# Patient Record
Sex: MALE | Race: WHITE | NOT HISPANIC OR LATINO
[De-identification: names, ages, dates, MRNs, and addresses within clinical notes are randomized per-mention and may not be internally consistent; named-entity substitution may affect disease eponyms.]

---

## 2019-04-17 ENCOUNTER — TRANSCRIPTION ENCOUNTER (OUTPATIENT)
Age: 29
End: 2019-04-17

## 2019-04-29 ENCOUNTER — TRANSCRIPTION ENCOUNTER (OUTPATIENT)
Age: 29
End: 2019-04-29

## 2019-08-28 ENCOUNTER — EMERGENCY (EMERGENCY)
Facility: HOSPITAL | Age: 29
LOS: 1 days | Discharge: HOME | End: 2019-08-28
Attending: EMERGENCY MEDICINE
Payer: SELF-PAY

## 2019-08-28 VITALS — HEIGHT: 72 IN | RESPIRATION RATE: 16 BRPM | OXYGEN SATURATION: 98 % | TEMPERATURE: 98 F | WEIGHT: 175.05 LBS

## 2019-08-28 PROCEDURE — 99053 MED SERV 10PM-8AM 24 HR FAC: CPT

## 2019-08-28 PROCEDURE — 99284 EMERGENCY DEPT VISIT MOD MDM: CPT

## 2019-08-28 PROCEDURE — 99285 EMERGENCY DEPT VISIT HI MDM: CPT

## 2019-08-28 NOTE — ED ADULT NURSE NOTE - CHIEF COMPLAINT QUOTE
Pt unsure why he was brought to hospital.  [facility] states he was brought to medical at [facility] and they were concerned for aspiration due to intoxication.  Patient confused in ED, admits to etoh.  Patient changed into yellow gown belongings secured in .

## 2019-08-28 NOTE — ED ADULT TRIAGE NOTE - CHIEF COMPLAINT QUOTE
Pt unsure why he was brought to hospital.  [facility] states he was brought to medical at [facility] and they were concerned for aspiration due to intoxication.  Patient confused in ED, admits to etoh.  Patient changed into yellow gown belongings secured in .

## 2019-08-28 NOTE — ED ADULT NURSE NOTE - OBJECTIVE STATEMENT
received adult male patient alert skin warm and dry to emergency room via [facility] ambulance intox simms respiration equal and unlabored will continue to monitor

## 2019-08-29 VITALS
HEART RATE: 96 BPM | DIASTOLIC BLOOD PRESSURE: 85 MMHG | TEMPERATURE: 98 F | SYSTOLIC BLOOD PRESSURE: 123 MMHG | RESPIRATION RATE: 18 BRPM | OXYGEN SATURATION: 99 %

## 2019-08-29 NOTE — ED PROVIDER NOTE - CLINICAL SUMMARY MEDICAL DECISION MAKING FREE TEXT BOX
Pt's friend arrived to ED to  pt. Pt currently has no complaints and is ambulatory with steady gait. Will discharge home.

## 2019-08-29 NOTE — ED PROVIDER NOTE - PATIENT PORTAL LINK FT
You can access the FollowMyHealth Patient Portal offered by [facility] by registering at the following website: [email or web address]. By joining [facility]’s FollowMyHealth portal, you will also be able to view your health information using other applications (apps) compatible with our system.

## 2019-08-29 NOTE — ED PROVIDER NOTE - OBJECTIVE STATEMENT
27 y/o M pt with no significant PMHx presents to the ED c/o alcohol intoxication, as well as vomiting. Pt was at a concert at [employer], but has limited memory of what happened. Denies fever, chills. No further complaints at this time.

## 2021-04-09 PROBLEM — Z78.9 OTHER SPECIFIED HEALTH STATUS: Chronic | Status: ACTIVE | Noted: 2019-08-28

## 2021-04-12 ENCOUNTER — APPOINTMENT (OUTPATIENT)
Age: 31
End: 2021-04-12
Payer: COMMERCIAL

## 2021-04-12 PROCEDURE — 0001A: CPT

## 2021-05-03 ENCOUNTER — APPOINTMENT (OUTPATIENT)
Age: 31
End: 2021-05-03
Payer: COMMERCIAL

## 2021-05-03 PROCEDURE — 0002A: CPT

## 2022-02-25 NOTE — ED ADULT NURSE NOTE - NS ED NURSE IV DC DT
1:1 time with the patient.  No changes made to the discharge plan at this time.   See the AVS for follow up appointments and recommendations.     Waiting to coordinate with Sorin Wyatt                      29-Aug-2019 02:55

## 2023-11-22 NOTE — ED ADULT NURSE NOTE - TEMPLATE LIST FOR HEAD TO TOE ASSESSMENT
Immobilization/assistive devices: brace/splint  4- DVT px: ASA 81 mg BID  5- Pain Medication: oxycodone
General